# Patient Record
Sex: MALE | Race: WHITE | NOT HISPANIC OR LATINO | Employment: OTHER | URBAN - METROPOLITAN AREA
[De-identification: names, ages, dates, MRNs, and addresses within clinical notes are randomized per-mention and may not be internally consistent; named-entity substitution may affect disease eponyms.]

---

## 2024-09-06 DIAGNOSIS — D72.821 MONOCYTOSIS: Primary | ICD-10-CM

## 2024-09-10 NOTE — PROGRESS NOTES
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  He was on routine blood work noted to have a persistent monocytosis.  And mild thrombocytopenia  Outside peripheral smear showed an absolute monocytosis.  There is a occasional rare nucleated red blood cells.  Differential diagnosis includes CMML, in addition patient was noted to have mild thrombocytopenia at 115.    CT scan chest shows no hilar mediastinal adenopathy, normal exam, no suspicious masses or nodules infiltrate, some chronic appearing interstitial on trait or scarring in the right lung base      He denies any fever chills or night sweats.  He still uses oral tobacco.    He was having chronic sinus congestion.  He was treated with antibiotics he was not had any more sinus issues.  He had a chest x-ray which showed an infiltrate in the repeat CT scan stated above.  No clear evidence of malignancy.  No lymphadenopathy.  Some mild bruising.  He still on his Eliquis but he dropped down to 2.5 once a day with his bruising.  We discussed the pros and cons of the above.        Past Medical History:   Diagnosis Date    Other pulmonary embolism without acute cor pulmonale     Appx. 4 years ago    PPD positive, treated     treated with INH years ago      .  Past Surgical History:   Procedure Laterality Date    carparl tunnel      and trigger finger    Parotidectomy        .  Family History   Problem Relation Name Age of Onset    Pulmonary embolism Father      Cancer Sister        Social History     Socioeconomic History    Marital status:    Tobacco Use    Smoking status: Never    Smokeless  tobacco: Never   Substance and Sexual Activity    Alcohol use: Never    Drug use: Never    Sexual activity: Not Currently      .Review of patient's allergies indicates:  No Known Allergies     Current Outpatient Medications:     ELIQUIS 2.5 mg Tab, Take 2.5 mg by mouth once daily., Disp: , Rfl:    Review of Systems     See HPI  Objective:     Vitals:    09/12/24 1106   BP: 119/70   Pulse: 64   Resp: 20   Temp: 97.8 °F (36.6 °C)         Physical Exam  Vitals reviewed.   Constitutional:       Appearance: Normal appearance.   HENT:      Head: Normocephalic and atraumatic.      Mouth/Throat:      Mouth: Mucous membranes are moist.      Pharynx: Oropharynx is clear.   Eyes:      Extraocular Movements: Extraocular movements intact.      Conjunctiva/sclera: Conjunctivae normal.      Pupils: Pupils are equal, round, and reactive to light.   Cardiovascular:      Rate and Rhythm: Normal rate and regular rhythm.      Pulses: Normal pulses.      Heart sounds: Normal heart sounds.   Pulmonary:      Effort: Pulmonary effort is normal.      Breath sounds: Normal breath sounds.   Abdominal:      General: Abdomen is flat. Bowel sounds are normal. There is no distension.      Palpations: Abdomen is soft.   Musculoskeletal:         General: Normal range of motion.      Cervical back: Normal range of motion and neck supple.   Skin:     General: Skin is warm and dry.   Neurological:      General: No focal deficit present.      Mental Status: He is alert and oriented to person, place, and time. Mental status is at baseline.   Psychiatric:         Attention and Perception: Attention normal.         Mood and Affect: Mood normal.         Speech: Speech normal.         Behavior: Behavior normal.         Thought Content: Thought content normal.         Judgment: Judgment normal.         ECOG SCORE    0 - Fully active-able to carry on all pre-disease performance without restriction        .Lab Review:  Outside labs and imaging reviewed    CT  scan:  08/24/2024: CT of the chest, patient had a normal chest x-ray showing right basilar airspace infiltrate.    The CT shows mild subpleural interstitial prominence in the anterior right lung base with lungs otherwise clear.  There is associated minimal pleural scarring which likely accounts for the appearance of the right basal segment on the chest x-ray.    There is no splenomegaly.  The upper abdominal structures are normal.  No adenopathy.        October 19, 2022: White count 10, monocytes 35%.  Platelets 160  July 21, 2024: White count 9, hematocrit 43, platelets 163, monocytes 32.9% (2.8)  June 13, 2024 white count 8.9, hematocrit 45, platelets 152, monocytes 35.6% neutrophils 39%  August 4, 2024 C-reactive protein 0.6   Sed rate 2   Platelet count 120   Monocyte count absolute 2.6  August 28, 2024, platelets 115, monocytes 32.7.  Neutrophil percentage 37%  Peripheral smear shows an increase in mature monocytes without immaturity.  Mild neutropenia is seen no significant left shift no blast rare nucleated red blood cells.  Platelets are normal differential includes reactive cause then myeloproliferative neoplasm    Assessment/Plan:   1.  History of pulmonary emboli               Okay to DC Eliquis and move to baby aspirin  2. Persistent monocytosis            Differential diagnosis discussed  :  Rule out myeloproliferative neoplasms   3. Thrombocytopenia---115 on last blood check            Monocytes - Monocytosis may accompany neutrophilia in various conditions, including:  Infections: brucellosis, varicella-zoster, bacterial endocarditis, tuberculosis ( was treated in past), malaria, typhoid fever, syphilis, trypanosomiasis, and others  Malignancies: CMML, CML, AML, Hodgkin lymphoma  Inflammatory conditions: sarcoidosis, inflammatory bowel disease, other autoimmune conditions  Medications: corticosteroids, myeloid growth factors  Other conditions: pregnancy, asplenia    Potential need a bone marrow biopsy  to rule out myeloproliferative neoplasms discussed    Top 3  :  Inflammatory, corticosteroids, myeloproliferative neoplasms, reactivation of TB.  However his CT does not show any infiltrates significant---and he was no related B symptoms    Labs today:  Orders Placed This Encounter    CBC Auto Differential    Comprehensive Metabolic Panel    Path Review, Peripheral Smear    Sedimentation Rate    C-Reactive Protein    Flow Cytometry Panel    BCR/ABL, RNA-QUAL, DIAGNOSTIC w/REFLEX, BLOOD    CBC with Differential         Follow up in about 8 weeks (around 11/7/2024). May change to telemed if needed       Errol Estrella MD    Lab Review:  Results for orders placed or performed in visit on 09/12/24   Comprehensive Metabolic Panel   Result Value Ref Range    Sodium 143 136 - 145 mmol/L    Potassium 4.3 3.5 - 5.1 mmol/L    Chloride 108 (H) 98 - 107 mmol/L    CO2 28 23 - 31 mmol/L    Glucose 97 82 - 115 mg/dL    Blood Urea Nitrogen 15.2 8.4 - 25.7 mg/dL    Creatinine 1.17 0.73 - 1.18 mg/dL    Calcium 9.5 8.8 - 10.0 mg/dL    Protein Total 7.0 5.8 - 7.6 gm/dL    Albumin 4.1 3.4 - 4.8 g/dL    Globulin 2.9 2.4 - 3.5 gm/dL    Albumin/Globulin Ratio 1.4 1.1 - 2.0 ratio    Bilirubin Total 0.9 <=1.5 mg/dL    ALP 70 40 - 150 unit/L    ALT 13 0 - 55 unit/L    AST 18 5 - 34 unit/L    eGFR >60 mL/min/1.73/m2    Anion Gap 7.0 mEq/L    BUN/Creatinine Ratio 13    Sedimentation Rate   Result Value Ref Range    Sed Rate 2 0 - 15 mm/hr   C-Reactive Protein   Result Value Ref Range    CRP <1.00 <5.00 mg/L   CBC with Differential   Result Value Ref Range    WBC 7.00 4.50 - 11.50 x10(3)/mcL    RBC 5.19 4.70 - 6.10 x10(6)/mcL    Hgb 15.7 14.0 - 18.0 g/dL    Hct 46.7 42.0 - 52.0 %    MCV 90.0 80.0 - 94.0 fL    MCH 30.3 27.0 - 31.0 pg    MCHC 33.6 33.0 - 36.0 g/dL    RDW 13.8 11.5 - 17.0 %    Platelet 147 130 - 400 x10(3)/mcL    MPV 10.1 7.4 - 10.4 fL    Neut % 48.9 %    Lymph % 24.1 %    Mono % 25.6 %    Eos % 0.9 %    Basophil % 0.4 %     Lymph # 1.69 0.6 - 4.6 x10(3)/mcL    Neut # 3.42 2.1 - 9.2 x10(3)/mcL    Mono # 1.79 (H) 0.1 - 1.3 x10(3)/mcL    Eos # 0.06 0 - 0.9 x10(3)/mcL    Baso # 0.03 <=0.2 x10(3)/mcL    IG# 0.01 0 - 0.04 x10(3)/mcL    IG% 0.1 %   t he World Health Organization defines persistent monocytosis as an absolute monocyte count > 1 × 109/L with monocytes accounting for > 10% of leukocytes persisting for > 3 months     Bcr-abl  pending  D/w pathology  his smear --this  is normal--does not think flow will help

## 2024-09-12 ENCOUNTER — OFFICE VISIT (OUTPATIENT)
Dept: HEMATOLOGY/ONCOLOGY | Facility: CLINIC | Age: 79
End: 2024-09-12
Payer: MEDICARE

## 2024-09-12 VITALS
HEIGHT: 69 IN | OXYGEN SATURATION: 98 % | WEIGHT: 194.69 LBS | DIASTOLIC BLOOD PRESSURE: 70 MMHG | HEART RATE: 64 BPM | RESPIRATION RATE: 20 BRPM | TEMPERATURE: 98 F | BODY MASS INDEX: 28.84 KG/M2 | SYSTOLIC BLOOD PRESSURE: 119 MMHG

## 2024-09-12 DIAGNOSIS — Z86.711 PERSONAL HISTORY OF PE (PULMONARY EMBOLISM): ICD-10-CM

## 2024-09-12 DIAGNOSIS — D69.6 THROMBOCYTOPENIA: ICD-10-CM

## 2024-09-12 DIAGNOSIS — D47.1 CHRONIC MYELOPROLIFERATIVE DISEASE: ICD-10-CM

## 2024-09-12 DIAGNOSIS — D72.821 MONOCYTOSIS: Primary | ICD-10-CM

## 2024-09-12 LAB
ALBUMIN SERPL-MCNC: 4.1 G/DL (ref 3.4–4.8)
ALBUMIN/GLOB SERPL: 1.4 RATIO (ref 1.1–2)
ALP SERPL-CCNC: 70 UNIT/L (ref 40–150)
ALT SERPL-CCNC: 13 UNIT/L (ref 0–55)
ANION GAP SERPL CALC-SCNC: 7 MEQ/L
AST SERPL-CCNC: 18 UNIT/L (ref 5–34)
BILIRUB SERPL-MCNC: 0.9 MG/DL
BUN SERPL-MCNC: 15.2 MG/DL (ref 8.4–25.7)
CALCIUM SERPL-MCNC: 9.5 MG/DL (ref 8.8–10)
CHLORIDE SERPL-SCNC: 108 MMOL/L (ref 98–107)
CO2 SERPL-SCNC: 28 MMOL/L (ref 23–31)
CREAT SERPL-MCNC: 1.17 MG/DL (ref 0.73–1.18)
CREAT/UREA NIT SERPL: 13
CRP SERPL-MCNC: <1 MG/L
ERYTHROCYTE [SEDIMENTATION RATE] IN BLOOD: 2 MM/HR (ref 0–15)
GFR SERPLBLD CREATININE-BSD FMLA CKD-EPI: >60 ML/MIN/1.73/M2
GLOBULIN SER-MCNC: 2.9 GM/DL (ref 2.4–3.5)
GLUCOSE SERPL-MCNC: 97 MG/DL (ref 82–115)
POTASSIUM SERPL-SCNC: 4.3 MMOL/L (ref 3.5–5.1)
PROT SERPL-MCNC: 7 GM/DL (ref 5.8–7.6)
SODIUM SERPL-SCNC: 143 MMOL/L (ref 136–145)

## 2024-09-12 PROCEDURE — 99999 PR PBB SHADOW E&M-EST. PATIENT-LVL IV: CPT | Mod: PBBFAC,,, | Performed by: INTERNAL MEDICINE

## 2024-09-12 PROCEDURE — 99214 OFFICE O/P EST MOD 30 MIN: CPT | Mod: PBBFAC | Performed by: INTERNAL MEDICINE

## 2024-09-12 PROCEDURE — 85652 RBC SED RATE AUTOMATED: CPT | Performed by: INTERNAL MEDICINE

## 2024-09-12 PROCEDURE — 80053 COMPREHEN METABOLIC PANEL: CPT | Performed by: INTERNAL MEDICINE

## 2024-09-12 PROCEDURE — 99204 OFFICE O/P NEW MOD 45 MIN: CPT | Mod: S$PBB,,, | Performed by: INTERNAL MEDICINE

## 2024-09-12 PROCEDURE — 86140 C-REACTIVE PROTEIN: CPT | Performed by: INTERNAL MEDICINE

## 2024-09-12 PROCEDURE — 36415 COLL VENOUS BLD VENIPUNCTURE: CPT | Performed by: INTERNAL MEDICINE

## 2024-09-12 PROCEDURE — 85025 COMPLETE CBC W/AUTO DIFF WBC: CPT | Performed by: INTERNAL MEDICINE

## 2024-09-12 RX ORDER — APIXABAN 2.5 MG/1
2.5 TABLET, FILM COATED ORAL DAILY
COMMUNITY
Start: 2024-04-26

## 2024-09-13 LAB
BASOPHILS # BLD AUTO: 0.03 X10(3)/MCL
BASOPHILS NFR BLD AUTO: 0.4 %
EOSINOPHIL # BLD AUTO: 0.06 X10(3)/MCL (ref 0–0.9)
EOSINOPHIL NFR BLD AUTO: 0.9 %
ERYTHROCYTE [DISTWIDTH] IN BLOOD BY AUTOMATED COUNT: 13.8 % (ref 11.5–17)
HCT VFR BLD AUTO: 46.7 % (ref 42–52)
HEMATOLOGIST REVIEW: NORMAL
HGB BLD-MCNC: 15.7 G/DL (ref 14–18)
IMM GRANULOCYTES # BLD AUTO: 0.01 X10(3)/MCL (ref 0–0.04)
IMM GRANULOCYTES NFR BLD AUTO: 0.1 %
LYMPHOCYTES # BLD AUTO: 1.69 X10(3)/MCL (ref 0.6–4.6)
LYMPHOCYTES NFR BLD AUTO: 24.1 %
MCH RBC QN AUTO: 30.3 PG (ref 27–31)
MCHC RBC AUTO-ENTMCNC: 33.6 G/DL (ref 33–36)
MCV RBC AUTO: 90 FL (ref 80–94)
MONOCYTES # BLD AUTO: 1.79 X10(3)/MCL (ref 0.1–1.3)
MONOCYTES NFR BLD AUTO: 25.6 %
NEUTROPHILS # BLD AUTO: 3.42 X10(3)/MCL (ref 2.1–9.2)
NEUTROPHILS NFR BLD AUTO: 48.9 %
PLATELET # BLD AUTO: 147 X10(3)/MCL (ref 130–400)
PMV BLD AUTO: 10.1 FL (ref 7.4–10.4)
RBC # BLD AUTO: 5.19 X10(6)/MCL (ref 4.7–6.1)
WBC # BLD AUTO: 7 X10(3)/MCL (ref 4.5–11.5)

## 2024-09-19 ENCOUNTER — PATIENT MESSAGE (OUTPATIENT)
Dept: HEMATOLOGY/ONCOLOGY | Facility: CLINIC | Age: 79
End: 2024-09-19
Payer: MEDICARE